# Patient Record
Sex: MALE | Race: WHITE | NOT HISPANIC OR LATINO | Employment: FULL TIME | URBAN - METROPOLITAN AREA
[De-identification: names, ages, dates, MRNs, and addresses within clinical notes are randomized per-mention and may not be internally consistent; named-entity substitution may affect disease eponyms.]

---

## 2022-04-11 ENCOUNTER — APPOINTMENT (OUTPATIENT)
Dept: CT IMAGING | Facility: CLINIC | Age: 47
End: 2022-04-11
Attending: EMERGENCY MEDICINE
Payer: COMMERCIAL

## 2022-04-11 ENCOUNTER — HOSPITAL ENCOUNTER (EMERGENCY)
Facility: CLINIC | Age: 47
Discharge: HOME | End: 2022-04-11
Attending: EMERGENCY MEDICINE | Admitting: EMERGENCY MEDICINE
Payer: COMMERCIAL

## 2022-04-11 VITALS
DIASTOLIC BLOOD PRESSURE: 88 MMHG | HEART RATE: 105 BPM | WEIGHT: 284 LBS | TEMPERATURE: 98.7 F | RESPIRATION RATE: 18 BRPM | SYSTOLIC BLOOD PRESSURE: 148 MMHG | OXYGEN SATURATION: 98 %

## 2022-04-11 DIAGNOSIS — R51.9 NONINTRACTABLE HEADACHE, UNSPECIFIED CHRONICITY PATTERN, UNSPECIFIED HEADACHE TYPE: ICD-10-CM

## 2022-04-11 LAB
ANION GAP SERPL CALCULATED.3IONS-SCNC: 6 MMOL/L (ref 3–14)
BASOPHILS # BLD AUTO: 0 10E3/UL (ref 0–0.2)
BASOPHILS NFR BLD AUTO: 0 %
BUN SERPL-MCNC: 13 MG/DL (ref 7–30)
CALCIUM SERPL-MCNC: 8.6 MG/DL (ref 8.5–10.1)
CHLORIDE BLD-SCNC: 109 MMOL/L (ref 94–109)
CO2 SERPL-SCNC: 26 MMOL/L (ref 20–32)
CREAT SERPL-MCNC: 1.1 MG/DL (ref 0.66–1.25)
DEPRECATED S PYO AG THROAT QL EIA: NEGATIVE
EOSINOPHIL # BLD AUTO: 0.2 10E3/UL (ref 0–0.7)
EOSINOPHIL NFR BLD AUTO: 3 %
ERYTHROCYTE [DISTWIDTH] IN BLOOD BY AUTOMATED COUNT: 12.4 % (ref 10–15)
GFR SERPL CREATININE-BSD FRML MDRD: 85 ML/MIN/1.73M2
GLUCOSE BLD-MCNC: 103 MG/DL (ref 70–99)
GROUP A STREP BY PCR: NOT DETECTED
HCT VFR BLD AUTO: 40.8 % (ref 40–53)
HGB BLD-MCNC: 14.2 G/DL (ref 13.3–17.7)
HOLD SPECIMEN: NORMAL
IMM GRANULOCYTES # BLD: 0 10E3/UL
IMM GRANULOCYTES NFR BLD: 1 %
LYMPHOCYTES # BLD AUTO: 1.2 10E3/UL (ref 0.8–5.3)
LYMPHOCYTES NFR BLD AUTO: 18 %
MCH RBC QN AUTO: 31.1 PG (ref 26.5–33)
MCHC RBC AUTO-ENTMCNC: 34.8 G/DL (ref 31.5–36.5)
MCV RBC AUTO: 90 FL (ref 78–100)
MONOCYTES # BLD AUTO: 0.5 10E3/UL (ref 0–1.3)
MONOCYTES NFR BLD AUTO: 8 %
NEUTROPHILS # BLD AUTO: 4.7 10E3/UL (ref 1.6–8.3)
NEUTROPHILS NFR BLD AUTO: 70 %
NRBC # BLD AUTO: 0 10E3/UL
NRBC BLD AUTO-RTO: 0 /100
PLATELET # BLD AUTO: 259 10E3/UL (ref 150–450)
POTASSIUM BLD-SCNC: 3.6 MMOL/L (ref 3.4–5.3)
RBC # BLD AUTO: 4.56 10E6/UL (ref 4.4–5.9)
SODIUM SERPL-SCNC: 141 MMOL/L (ref 133–144)
WBC # BLD AUTO: 6.7 10E3/UL (ref 4–11)

## 2022-04-11 PROCEDURE — 87651 STREP A DNA AMP PROBE: CPT | Performed by: EMERGENCY MEDICINE

## 2022-04-11 PROCEDURE — 96374 THER/PROPH/DIAG INJ IV PUSH: CPT | Performed by: EMERGENCY MEDICINE

## 2022-04-11 PROCEDURE — 250N000011 HC RX IP 250 OP 636: Performed by: EMERGENCY MEDICINE

## 2022-04-11 PROCEDURE — 85025 COMPLETE CBC W/AUTO DIFF WBC: CPT | Performed by: EMERGENCY MEDICINE

## 2022-04-11 PROCEDURE — 70450 CT HEAD/BRAIN W/O DYE: CPT

## 2022-04-11 PROCEDURE — 99285 EMERGENCY DEPT VISIT HI MDM: CPT | Mod: 25 | Performed by: EMERGENCY MEDICINE

## 2022-04-11 PROCEDURE — 36415 COLL VENOUS BLD VENIPUNCTURE: CPT | Performed by: EMERGENCY MEDICINE

## 2022-04-11 PROCEDURE — 99284 EMERGENCY DEPT VISIT MOD MDM: CPT | Performed by: EMERGENCY MEDICINE

## 2022-04-11 PROCEDURE — 80048 BASIC METABOLIC PNL TOTAL CA: CPT | Performed by: EMERGENCY MEDICINE

## 2022-04-11 PROCEDURE — 96375 TX/PRO/DX INJ NEW DRUG ADDON: CPT | Performed by: EMERGENCY MEDICINE

## 2022-04-11 RX ORDER — IBUPROFEN 200 MG
600 TABLET ORAL EVERY 6 HOURS PRN
COMMUNITY

## 2022-04-11 RX ORDER — KETOROLAC TROMETHAMINE 30 MG/ML
30 INJECTION, SOLUTION INTRAMUSCULAR; INTRAVENOUS ONCE
Status: COMPLETED | OUTPATIENT
Start: 2022-04-11 | End: 2022-04-11

## 2022-04-11 RX ORDER — AMOXICILLIN 875 MG
875 TABLET ORAL 2 TIMES DAILY
COMMUNITY
Start: 2022-04-10 | End: 2024-08-28

## 2022-04-11 RX ADMIN — KETOROLAC TROMETHAMINE 30 MG: 30 INJECTION, SOLUTION INTRAMUSCULAR at 19:20

## 2022-04-11 RX ADMIN — PROCHLORPERAZINE EDISYLATE 10 MG: 5 INJECTION INTRAMUSCULAR; INTRAVENOUS at 19:20

## 2022-04-11 NOTE — ED TRIAGE NOTES
Patient c/o swelling to his head and pain to the back of his head. He is currently taking an antibiotic for strep throat.

## 2022-04-12 NOTE — ED PROVIDER NOTES
History     Chief Complaint   Patient presents with     Headache     HPI  Aden Centeno is a 46 year old male who presents for evaluation of illness.  Symptoms began 2 days ago.  He started with a sore throat.  He was seen in urgent care in [place] yesterday.  They attempted to perform a strep test but was unfortunately placed in the wrong medium and made therefore we could not just treated him for strep since their teenage son currently has strep as well.  He is also now developed a headache and feeling of pressure, bitemporal.  No vomiting.  No photophobia.  No abdominal discomfort.  No cough.  Headache is currently a 6 out of 10.  It feels like his head is too tight he states.    Allergies:  No Known Allergies    Problem List:    There are no problems to display for this patient.       Past Medical History:    History reviewed. No pertinent past medical history.    Past Surgical History:    No past surgical history on file.    Family History:    No family history on file.    Social History:  Marital Status:   [2]  Social History     Tobacco Use     Smoking status: Never Smoker     Smokeless tobacco: Never Used   Substance Use Topics     Alcohol use: Yes     Alcohol/week: 3.0 standard drinks     Types: 3 Cans of beer per week        Medications:    amoxicillin (AMOXIL) 875 MG tablet  Ascorbic Acid 500 MG CHEW  ibuprofen (ADVIL/MOTRIN) 200 MG tablet          Review of Systems  All other systems are reviewed and are negative    Physical Exam   BP: (!) 174/110  Pulse: 112  Temp: 98.8  F (37.1  C)  Resp: 20  Weight: 128.8 kg (284 lb)  SpO2: 99 %      Physical Exam  Vitals and nursing note reviewed.   Constitutional:       General: He is not in acute distress.     Appearance: He is well-developed. He is not diaphoretic.   HENT:      Head: Normocephalic and atraumatic.      Right Ear: Tympanic membrane normal.      Left Ear: Tympanic membrane normal.      Mouth/Throat:      Mouth: Mucous membranes are moist.       Pharynx: Oropharynx is clear. No oropharyngeal exudate or posterior oropharyngeal erythema.   Eyes:      General: No scleral icterus.        Right eye: No discharge.         Left eye: No discharge.      Conjunctiva/sclera: Conjunctivae normal.   Cardiovascular:      Rate and Rhythm: Normal rate and regular rhythm.      Heart sounds: Normal heart sounds. No murmur heard.  Pulmonary:      Effort: Pulmonary effort is normal. No respiratory distress.      Breath sounds: Normal breath sounds. No stridor.   Abdominal:      Palpations: Abdomen is soft.      Tenderness: There is no abdominal tenderness.   Musculoskeletal:         General: Normal range of motion.      Cervical back: Normal range of motion and neck supple.   Skin:     General: Skin is warm and dry.      Coloration: Skin is not pale.      Findings: No erythema or rash.   Neurological:      Mental Status: He is alert.      Cranial Nerves: No cranial nerve deficit.      Motor: No abnormal muscle tone.         ED Course                 Procedures              Critical Care time:  none               Results for orders placed or performed during the hospital encounter of 04/11/22 (from the past 24 hour(s))   [place] Draw    Narrative    The following orders were created for panel order [place] [facility].  Procedure                               Abnormality         Status                     ---------                               -----------         ------                     Extra Red Top Tube[[id number]]                               Final result               Extra Green Top (Lithium...[[id number]]                      Final result               Extra Purple Top Tube[[id number]]                            Final result                 Please view results for these tests on the individual orders.   Extra Red Top Tube   Result Value Ref Range    Hold Specimen [facility]    Extra Green Top (Lithium Heparin) Tube   Result Value Ref Range    Hold Specimen [facility]    Extra Purple  Top Tube   Result Value Ref Range    Hold Specimen [facility]    Basic metabolic panel   Result Value Ref Range    Sodium 141 133 - 144 mmol/L    Potassium 3.6 3.4 - 5.3 mmol/L    Chloride 109 94 - 109 mmol/L    Carbon Dioxide (CO2) 26 20 - 32 mmol/L    Anion Gap 6 3 - 14 mmol/L    Urea Nitrogen 13 7 - 30 mg/dL    Creatinine 1.10 0.66 - 1.25 mg/dL    Calcium 8.6 8.5 - 10.1 mg/dL    Glucose 103 (H) 70 - 99 mg/dL    GFR Estimate 85 >60 mL/min/1.73m2   CBC with platelets differential    Narrative    The following orders were created for panel order CBC with platelets differential.  Procedure                               Abnormality         Status                     ---------                               -----------         ------                     CBC with platelets and d...[[id number]]                      Final result                 Please view results for these tests on the individual orders.   CBC with platelets and differential   Result Value Ref Range    WBC Count 6.7 4.0 - 11.0 10e3/uL    RBC Count 4.56 4.40 - 5.90 10e6/uL    Hemoglobin 14.2 13.3 - 17.7 g/dL    Hematocrit 40.8 40.0 - 53.0 %    MCV 90 78 - 100 fL    MCH 31.1 26.5 - 33.0 pg    MCHC 34.8 31.5 - 36.5 g/dL    RDW 12.4 10.0 - 15.0 %    Platelet Count 259 150 - 450 10e3/uL    % Neutrophils 70 %    % Lymphocytes 18 %    % Monocytes 8 %    % Eosinophils 3 %    % Basophils 0 %    % Immature Granulocytes 1 %    NRBCs per 100 WBC 0 <1 /100    Absolute Neutrophils 4.7 1.6 - 8.3 10e3/uL    Absolute Lymphocytes 1.2 0.8 - 5.3 10e3/uL    Absolute Monocytes 0.5 0.0 - 1.3 10e3/uL    Absolute Eosinophils 0.2 0.0 - 0.7 10e3/uL    Absolute Basophils 0.0 0.0 - 0.2 10e3/uL    Absolute Immature Granulocytes 0.0 <=0.4 10e3/uL    Absolute NRBCs 0.0 10e3/uL   Streptococcus A Rapid Screen w/Reflex to PCR    Specimen: Throat; Swab   Result Value Ref Range    Group A Strep antigen Negative Negative   CT Head w/o Contrast    Narrative    EXAM: CT HEAD W/O CONTRAST  LOCATION: [facility]  DATE/TIME: 4/11/2022 7:39 PM    INDICATION: Headache, intracranial hemorrhage suspected  COMPARISON: None.  TECHNIQUE: Routine CT Head without IV contrast. Multiplanar reformats. Dose reduction techniques were used.    FINDINGS:  INTRACRANIAL CONTENTS: No intracranial hemorrhage, extraaxial collection, or mass effect.  No CT evidence of acute infarct. Normal parenchymal attenuation. Normal ventricles and sulci.     VISUALIZED ORBITS/SINUSES/MASTOIDS: No intraorbital abnormality. No paranasal sinus mucosal disease. No middle ear or mastoid effusion.    BONES/SOFT TISSUES: No acute abnormality.      Impression    IMPRESSION:  1.  Normal head CT.       Medications   ketorolac (TORADOL) injection 30 mg (30 mg Intravenous Given 4/11/22 1920)   prochlorperazine (COMPAZINE) injection 10 mg (10 mg Intravenous Given 4/11/22 1920)       Assessments & Plan (with Medical Decision Making)  46-year-old male presented with headache and above constellation of symptoms.  Head CT normal.  No signs of meningitis.  White count normal.  No fever.  Headache nearly gone with above Toradol and Compazine.  Stable for discharge home.  Follow-up in clinic if not improving in 3 to 4 days.  Return anytime sooner the emergency department if condition worsens or any other concern.     I have reviewed the nursing notes.    I have reviewed the findings, diagnosis, plan and need for follow up with the patient.       New Prescriptions    No medications on file       Final diagnoses:   Nonintractable headache, unspecified chronicity pattern, unspecified headache type       4/11/2022   [facility] EMERGENCY DEPT     Hussain Rabago MD  04/11/22 2045

## 2024-08-28 ENCOUNTER — OFFICE VISIT (OUTPATIENT)
Dept: FAMILY MEDICINE | Facility: OTHER | Age: 49
End: 2024-08-28
Payer: COMMERCIAL

## 2024-08-28 VITALS
SYSTOLIC BLOOD PRESSURE: 122 MMHG | WEIGHT: 280 LBS | BODY MASS INDEX: 39.2 KG/M2 | HEIGHT: 71 IN | RESPIRATION RATE: 18 BRPM | DIASTOLIC BLOOD PRESSURE: 84 MMHG | HEART RATE: 87 BPM | OXYGEN SATURATION: 97 % | TEMPERATURE: 97.9 F

## 2024-08-28 DIAGNOSIS — E66.812 CLASS 2 SEVERE OBESITY DUE TO EXCESS CALORIES WITH SERIOUS COMORBIDITY AND BODY MASS INDEX (BMI) OF 39.0 TO 39.9 IN ADULT (H): ICD-10-CM

## 2024-08-28 DIAGNOSIS — R60.9 SWELLING OF BODY REGION: Primary | ICD-10-CM

## 2024-08-28 DIAGNOSIS — E66.01 CLASS 2 SEVERE OBESITY DUE TO EXCESS CALORIES WITH SERIOUS COMORBIDITY AND BODY MASS INDEX (BMI) OF 39.0 TO 39.9 IN ADULT (H): ICD-10-CM

## 2024-08-28 DIAGNOSIS — L50.3 DERMATOGRAPHIA: ICD-10-CM

## 2024-08-28 PROBLEM — R73.03 PREDIABETES: Status: ACTIVE | Noted: 2023-11-14

## 2024-08-28 PROBLEM — R78.81 MSSA BACTEREMIA: Status: ACTIVE | Noted: 2019-11-20

## 2024-08-28 PROBLEM — B95.61 MSSA BACTEREMIA: Status: ACTIVE | Noted: 2019-11-20

## 2024-08-28 PROBLEM — K63.89 CECUM MASS: Status: ACTIVE | Noted: 2023-05-01

## 2024-08-28 PROBLEM — K42.9 UMBILICAL HERNIA WITHOUT OBSTRUCTION AND WITHOUT GANGRENE: Status: ACTIVE | Noted: 2017-03-01

## 2024-08-28 PROBLEM — M54.9 BACKACHE: Status: ACTIVE | Noted: 2021-07-20

## 2024-08-28 PROBLEM — G47.10 HYPERSOMNIA: Status: ACTIVE | Noted: 2019-11-21

## 2024-08-28 PROBLEM — R06.83 LOUD SNORING: Status: ACTIVE | Noted: 2022-10-18

## 2024-08-28 PROBLEM — L30.9 ECZEMA: Status: ACTIVE | Noted: 2022-10-18

## 2024-08-28 PROBLEM — I10 ESSENTIAL HYPERTENSION: Status: ACTIVE | Noted: 2022-10-18

## 2024-08-28 LAB
BASOPHILS # BLD AUTO: 0 10E3/UL (ref 0–0.2)
BASOPHILS NFR BLD AUTO: 0 %
EOSINOPHIL # BLD AUTO: 0.2 10E3/UL (ref 0–0.7)
EOSINOPHIL NFR BLD AUTO: 3 %
ERYTHROCYTE [DISTWIDTH] IN BLOOD BY AUTOMATED COUNT: 12.5 % (ref 10–15)
HCT VFR BLD AUTO: 45.7 % (ref 40–53)
HGB BLD-MCNC: 15.3 G/DL (ref 13.3–17.7)
IMM GRANULOCYTES # BLD: 0 10E3/UL
IMM GRANULOCYTES NFR BLD: 0 %
LYMPHOCYTES # BLD AUTO: 1.5 10E3/UL (ref 0.8–5.3)
LYMPHOCYTES NFR BLD AUTO: 20 %
MCH RBC QN AUTO: 30.7 PG (ref 26.5–33)
MCHC RBC AUTO-ENTMCNC: 33.5 G/DL (ref 31.5–36.5)
MCV RBC AUTO: 92 FL (ref 78–100)
MONOCYTES # BLD AUTO: 0.8 10E3/UL (ref 0–1.3)
MONOCYTES NFR BLD AUTO: 10 %
NEUTROPHILS # BLD AUTO: 4.8 10E3/UL (ref 1.6–8.3)
NEUTROPHILS NFR BLD AUTO: 66 %
PLATELET # BLD AUTO: 244 10E3/UL (ref 150–450)
RBC # BLD AUTO: 4.99 10E6/UL (ref 4.4–5.9)
WBC # BLD AUTO: 7.3 10E3/UL (ref 4–11)

## 2024-08-28 PROCEDURE — 85025 COMPLETE CBC W/AUTO DIFF WBC: CPT | Performed by: FAMILY MEDICINE

## 2024-08-28 PROCEDURE — 99203 OFFICE O/P NEW LOW 30 MIN: CPT | Performed by: FAMILY MEDICINE

## 2024-08-28 PROCEDURE — 87040 BLOOD CULTURE FOR BACTERIA: CPT | Performed by: FAMILY MEDICINE

## 2024-08-28 PROCEDURE — 36415 COLL VENOUS BLD VENIPUNCTURE: CPT | Performed by: FAMILY MEDICINE

## 2024-08-28 RX ORDER — LISINOPRIL 10 MG/1
10 TABLET ORAL EVERY MORNING
COMMUNITY
Start: 2024-08-13

## 2024-08-28 ASSESSMENT — PAIN SCALES - GENERAL: PAINLEVEL: MODERATE PAIN (5)

## 2024-08-28 NOTE — PROGRESS NOTES
Assessment & Plan         ICD-10-CM    1. Swelling of body region  R60.9 CBC with platelets and differential     Blood Culture Peripheral Blood     Orthopedic  Referral     CBC with platelets and differential     Blood Culture Peripheral Blood      2. Class 2 severe obesity due to excess calories with serious comorbidity and body mass index (BMI) of 39.0 to 39.9 in adult (H)  E66.01     Z68.39       3. 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 Has only really felt well when on antibiotics for years.  Has a history of several MSSA infections in the past with the first occurring in 2015 with a right elbow infection.  He was well again until he developed sepsis in November 2019 and there was on and explained source as to why this occurred at the time though blood cultures were positive and he was treated and eventually did improve with prolonged antibiotics.  By July 2020 he was having recurrent issues with swelling and inflammation and was evaluated with his back in the hospital.  At this time they found a small foreign body that was still present after having a removal and and drainage after small wire brush piece was found.  I could not find record of the time of this actually being removed but he did say that there was difficulty with healing and that it had completely healed by the July image with MRI and CT both seen a 2 mm small metal piece still present.  He has continued to have several areas of MSSA infection after surgical corrections or injections and anytime that he has a procedure to break his skin.  As none of these have been resistant staph I do suspect anything to improve his immune system could be helpful.  But as the start of these generically inflames symptoms occurred around the time that he developed sepsis which appears to be fully evaluated without a source but was found to have a wire brush piece that had been  completely healed over and scarred in several months after his sepsis episode which makes us wonder if this was the original source of infection.  As such with a piece still present in his arm it may be explaining why there seems to be fluid inflammation and easy susceptibility still.  As he is functioning well without any signs and symptoms of infections with higher heart rate temperature blood pressures or any changes notable from labs done previously I am not sure that anything definitive needs to be done even though this may be a source for him.  I did get another CBC with differential and blood culture today just to see if there was some systemic MSSA from this foreign body that is still circulating.  If there is nothing present in these cultures and labs the definitive treatment is not clear but he should talk to the orthopedic surgeons who could correct this to make a decision about whether it would be worthwhile for an attempt knowing the timing and history is such that this may be the source of his general unwell feeling.  Referral was placed.    I spent a total of 44 minutes on the day of the visit.   Time spent by me doing chart review, history and exam, documentation and further activities per the note    Rosa Estes MD               Brissa Samaniego is a 48 year old, presenting for the following health issues:  Infection        8/28/2024     2:01 PM   Additional Questions   Roomed by ruth ann   Accompanied by alone         8/28/2024     2:01 PM   Patient Reported Additional Medications   Patient reports taking the following new medications none     History of Present Illness       Back Pain:  He presents for follow up of back pain. Patient's back pain is a recurring problem.  Location of back pain:  Right lower back, left shoulder and left side of waist  Description of back pain: dull ache and gnawing  Back pain spreads: right buttocks and right side of neck    Since patient first noticed back pain,  "pain is: always present, but gets better and worse  Does back pain interfere with his job:  Yes       Hypertension: He presents for follow up of hypertension.  He does not check blood pressure  regularly outside of the clinic. Outside blood pressures have been over 140/90. He does not follow a low salt diet.     Headaches:   Since the patient's last clinic visit, headaches are: no change  The patient is getting headaches:  3to 4 times a week  He is able to do normal daily activities when he has a migraine.  The patient is taking the following rescue/relief medications:  Ibuprofen (Advil, Motrin)   Patient states \"The relief is inconsistent\" from the rescue/relief medications.   The patient is taking the following medications to prevent migraines:  No medications to prevent migraines  In the past 4 weeks, the patient has gone to an Urgent Care or Emergency Room 0 times times due to headaches.    He eats 2-3 servings of fruits and vegetables daily.He consumes 1 sweetened beverage(s) daily.He exercises with enough effort to increase his heart rate 60 or more minutes per day.  He exercises with enough effort to increase his heart rate 5 days per week.   He is taking medications regularly.     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 Seems like he is more susceptible to staff infections now. Removed cyst last year and got staff when it was removed. Removed wire bristle and got infection. Thumb infection as well. If any doctor has done any cut anywhere since, there has been a staph infection. Tonsils,         Feels flushed, dermagraphia, more easily swollen, hard to heal after injury. Has had multiple MSSA infections in the past. Not sure why he just feels under the weather since he was seen in 2019 with sepsis.    Review of Systems  Constitutional, HEENT, cardiovascular, pulmonary, GI, , " "musculoskeletal, neuro, skin, endocrine and psych systems are negative, except as otherwise noted.      Objective    /84   Pulse 87   Temp 97.9  F (36.6  C) (Temporal)   Resp 18   Ht 1.803 m (5' 11\")   Wt 127 kg (280 lb)   SpO2 97%   BMI 39.05 kg/m    Body mass index is 39.05 kg/m .  Physical Exam   GENERAL: alert and no distress  RESP: lungs clear to auscultation - no rales, rhonchi or wheezes  CV: regular rate and rhythm, normal S1 S2, no S3 or S4, no murmur, click or rub, no peripheral edema  MS: several scars, noted swelling at L mid arm with a fluid pocket type feel at the incision.  SKIN: dermatographia noted as well as flushed red spots that migrate he thought related to pressure, but some were unexplained.  NEURO: Normal strength and tone, mentation intact and speech normal  PSYCH: mentation appears normal, affect normal/bright            Signed Electronically by: Rosa Estes MD, MD    "

## 2024-09-02 LAB — BACTERIA BLD CULT: NO GROWTH

## 2024-10-30 ENCOUNTER — OFFICE VISIT (OUTPATIENT)
Dept: ORTHOPEDICS | Facility: CLINIC | Age: 49
End: 2024-10-30
Attending: FAMILY MEDICINE
Payer: COMMERCIAL

## 2024-10-30 ENCOUNTER — ANCILLARY PROCEDURE (OUTPATIENT)
Dept: GENERAL RADIOLOGY | Facility: CLINIC | Age: 49
End: 2024-10-30
Attending: ORTHOPAEDIC SURGERY
Payer: COMMERCIAL

## 2024-10-30 VITALS — DIASTOLIC BLOOD PRESSURE: 87 MMHG | TEMPERATURE: 97.6 F | SYSTOLIC BLOOD PRESSURE: 134 MMHG

## 2024-10-30 DIAGNOSIS — S50.852A FOREIGN BODY IN LEFT FOREARM, INITIAL ENCOUNTER: Primary | ICD-10-CM

## 2024-10-30 DIAGNOSIS — S50.852A FOREIGN BODY IN LEFT FOREARM, INITIAL ENCOUNTER: ICD-10-CM

## 2024-10-30 DIAGNOSIS — R60.9 SWELLING OF BODY REGION: ICD-10-CM

## 2024-10-30 PROCEDURE — 73090 X-RAY EXAM OF FOREARM: CPT | Mod: TC | Performed by: RADIOLOGY

## 2024-10-30 PROCEDURE — 99203 OFFICE O/P NEW LOW 30 MIN: CPT | Performed by: ORTHOPAEDIC SURGERY

## 2024-10-30 NOTE — PATIENT INSTRUCTIONS
Thank you for choosing [facility]!      FOLLOW-UP  Dr. Ty would like you to follow-up after MRI. Please stop by the  on your way out or call [phone] to schedule.       IMAGING  Please call [phone] to schedule your MRI .     *Once your imaging exam has been scheduled, our prior authorization team will connect with your insurance to ensure coverage of the exam. They will only reach out to you if a prior authorization is denied.  Please schedule your imaging exam at least 7 days out so our team has time to complete this process.  Failure to do so could result in insurance denial and you becoming responsible for the cost of the exam.     After your imaging appointment is scheduled, call [phone] to schedule your an in-person follow-up appointment with Dr. Ty to discuss your results. .

## 2024-10-30 NOTE — LETTER
10/30/2024      Aden Centeno  [address]  [place] [address]      Dear Colleague,    Thank you for referring your patient, Aden Centeno, to the [facility]. Please see a copy of my visit note below.    S:  In brace for RUE after distal biceps repair in [facility].  Feels is doing well but having some drainage.  LUE had hx of foreign body retention/ bristle brush and surgery to remove foreign body some time ago.  Has hx of infection with surgeries.         Patient Active Problem List   Diagnosis     Backache     Hypersomnia     MSSA bacteremia     Umbilical hernia without obstruction and without gangrene     Prediabetes     Loud snoring     Essential hypertension     Eczema     Cecum mass     Class 2 severe obesity due to excess calories with serious comorbidity in adult (H)          No past medical history on file.       No past surgical history on file.         Social History     Tobacco Use     Smoking status: Never     Smokeless tobacco: Never   Substance Use Topics     Alcohol use: Yes     Alcohol/week: 3.0 standard drinks of alcohol     Types: 3 Cans of beer per week          No family history on file.          No Known Allergies         Current Outpatient Medications   Medication Sig Dispense Refill     Ascorbic Acid 500 MG CHEW Take 500 mg by mouth daily       ibuprofen (ADVIL/MOTRIN) 200 MG tablet Take 600 mg by mouth every 6 hours as needed for inflammatory pain       lisinopril (ZESTRIL) 10 MG tablet Take 10 mg by mouth every morning.            Review Of Systems  Skin: negative  Eyes: negative  Ears/Nose/Throat: negative  Respiratory: No shortness of breath, dyspnea on exertion, cough, or hemoptysis    O: Physical Exam:  No palpable mass.  Patient points to site that he thinks is retention.  Proximal dorsal radial aspect forearm.  Well healed incision site with patient pointing to about 2 cm proximal and dorsal to this.  Adequate pronation and supination.  Adequate elbow  flexion and extension.  CMS intact.      Lab:Hgb A1C 6.1    Images:  Narrative & Impression   XR FOREARM LEFT 2 VIEWS 10/30/2024 1:49 PM      HISTORY: Foreign body in left forearm, initial encounter     COMPARISON: None.                                                                          IMPRESSION: The left forearm bones are negative for fracture or  dislocation. No high density foreign bodies identified. On the AP  view, there is a small focus of irregularity to the soft tissue  muscular contour along the medial aspect of the forearm which could  represent low-density foreign body. Correlation is recommended. There  is no gas or lucency around this region to suggest air tracking in  through puncture wound or skin surface.            A:  possible retained foreign body LUE proximal radial dorsal forearm      P:  MRI L proximal forearm  See back after study  Notify if exacerbation symptoms             In addition to the above assessment and plan each active problem on Aden's problem list was evaluated today. This included the questioning of Aden for any medication problems. We will continue the current treatment plan for these active problems except as noted.        Again, thank you for allowing me to participate in the care of your patient.        Sincerely,        Luis Manuel Ty MD

## 2024-10-30 NOTE — PROGRESS NOTES
S:  In brace for RUE after distal biceps repair in [facility].  Feels is doing well but having some drainage.  SILVIA had hx of foreign body retention/ bristle brush and surgery to remove foreign body some time ago.  Has hx of infection with surgeries.         Patient Active Problem List   Diagnosis    Backache    Hypersomnia    MSSA bacteremia    Umbilical hernia without obstruction and without gangrene    Prediabetes    Loud snoring    Essential hypertension    Eczema    Cecum mass    Class 2 severe obesity due to excess calories with serious comorbidity in adult (H)          No past medical history on file.       No past surgical history on file.         Social History     Tobacco Use    Smoking status: Never    Smokeless tobacco: Never   Substance Use Topics    Alcohol use: Yes     Alcohol/week: 3.0 standard drinks of alcohol     Types: 3 Cans of beer per week          No family history on file.          No Known Allergies         Current Outpatient Medications   Medication Sig Dispense Refill    Ascorbic Acid 500 MG CHEW Take 500 mg by mouth daily      ibuprofen (ADVIL/MOTRIN) 200 MG tablet Take 600 mg by mouth every 6 hours as needed for inflammatory pain      lisinopril (ZESTRIL) 10 MG tablet Take 10 mg by mouth every morning.            Review Of Systems  Skin: negative  Eyes: negative  Ears/Nose/Throat: negative  Respiratory: No shortness of breath, dyspnea on exertion, cough, or hemoptysis    O: Physical Exam:  No palpable mass.  Patient points to site that he thinks is retention.  Proximal dorsal radial aspect forearm.  Well healed incision site with patient pointing to about 2 cm proximal and dorsal to this.  Adequate pronation and supination.  Adequate elbow flexion and extension.  CMS intact.      Lab:Hgb A1C 6.1    Images:  Narrative & Impression   XR FOREARM LEFT 2 VIEWS 10/30/2024 1:49 PM      HISTORY: Foreign body in left forearm, initial encounter     COMPARISON: None.                                                                           IMPRESSION: The left forearm bones are negative for fracture or  dislocation. No high density foreign bodies identified. On the AP  view, there is a small focus of irregularity to the soft tissue  muscular contour along the medial aspect of the forearm which could  represent low-density foreign body. Correlation is recommended. There  is no gas or lucency around this region to suggest air tracking in  through puncture wound or skin surface.            A:  possible retained foreign body LUE proximal radial dorsal forearm      P:  MRI L proximal forearm  See back after study  Notify if exacerbation symptoms             In addition to the above assessment and plan each active problem on Aden's problem list was evaluated today. This included the questioning of Aden for any medication problems. We will continue the current treatment plan for these active problems except as noted.

## 2024-11-12 ENCOUNTER — HOSPITAL ENCOUNTER (OUTPATIENT)
Dept: MRI IMAGING | Facility: CLINIC | Age: 49
Discharge: HOME | End: 2024-11-12
Attending: ORTHOPAEDIC SURGERY | Admitting: ORTHOPAEDIC SURGERY
Payer: COMMERCIAL

## 2024-11-12 DIAGNOSIS — S50.852A FOREIGN BODY IN LEFT FOREARM, INITIAL ENCOUNTER: ICD-10-CM

## 2024-11-12 DIAGNOSIS — R60.9 SWELLING OF BODY REGION: ICD-10-CM

## 2024-11-12 PROCEDURE — 73218 MRI UPPER EXTREMITY W/O DYE: CPT | Mod: LT

## 2024-11-12 PROCEDURE — 73218 MRI UPPER EXTREMITY W/O DYE: CPT | Mod: 26 | Performed by: RADIOLOGY

## 2025-01-04 ENCOUNTER — HEALTH MAINTENANCE LETTER (OUTPATIENT)
Age: 50
End: 2025-01-04